# Patient Record
Sex: FEMALE | NOT HISPANIC OR LATINO
[De-identification: names, ages, dates, MRNs, and addresses within clinical notes are randomized per-mention and may not be internally consistent; named-entity substitution may affect disease eponyms.]

---

## 2017-01-03 ENCOUNTER — CHARTING TRANS (OUTPATIENT)
Dept: OTHER | Age: 47
End: 2017-01-03

## 2017-01-04 ENCOUNTER — CHARTING TRANS (OUTPATIENT)
Dept: OTHER | Age: 47
End: 2017-01-04

## 2017-01-10 ENCOUNTER — CHARTING TRANS (OUTPATIENT)
Dept: OTHER | Age: 47
End: 2017-01-10

## 2017-01-11 ENCOUNTER — CHARTING TRANS (OUTPATIENT)
Dept: OTHER | Age: 47
End: 2017-01-11

## 2017-01-13 ENCOUNTER — CHARTING TRANS (OUTPATIENT)
Dept: OTHER | Age: 47
End: 2017-01-13

## 2017-01-18 ENCOUNTER — CHARTING TRANS (OUTPATIENT)
Dept: OTHER | Age: 47
End: 2017-01-18

## 2017-01-20 ENCOUNTER — CHARTING TRANS (OUTPATIENT)
Dept: OTHER | Age: 47
End: 2017-01-20

## 2017-01-23 ENCOUNTER — CHARTING TRANS (OUTPATIENT)
Dept: OTHER | Age: 47
End: 2017-01-23

## 2017-01-25 ENCOUNTER — CHARTING TRANS (OUTPATIENT)
Dept: OTHER | Age: 47
End: 2017-01-25

## 2017-02-06 ENCOUNTER — CHARTING TRANS (OUTPATIENT)
Dept: OTHER | Age: 47
End: 2017-02-06

## 2017-02-10 ENCOUNTER — CHARTING TRANS (OUTPATIENT)
Dept: OTHER | Age: 47
End: 2017-02-10

## 2017-02-13 ENCOUNTER — CHARTING TRANS (OUTPATIENT)
Dept: OTHER | Age: 47
End: 2017-02-13

## 2017-02-16 ENCOUNTER — CHARTING TRANS (OUTPATIENT)
Dept: OTHER | Age: 47
End: 2017-02-16

## 2017-02-20 ENCOUNTER — CHARTING TRANS (OUTPATIENT)
Dept: OTHER | Age: 47
End: 2017-02-20

## 2017-02-23 ENCOUNTER — CHARTING TRANS (OUTPATIENT)
Dept: OTHER | Age: 47
End: 2017-02-23

## 2017-03-01 ENCOUNTER — CHARTING TRANS (OUTPATIENT)
Dept: OTHER | Age: 47
End: 2017-03-01

## 2017-03-06 ENCOUNTER — CHARTING TRANS (OUTPATIENT)
Dept: OTHER | Age: 47
End: 2017-03-06

## 2017-03-08 ENCOUNTER — CHARTING TRANS (OUTPATIENT)
Dept: OTHER | Age: 47
End: 2017-03-08

## 2017-03-14 ENCOUNTER — APPOINTMENT (OUTPATIENT)
Dept: LAB | Age: 47
End: 2017-03-14
Attending: INTERNAL MEDICINE
Payer: COMMERCIAL

## 2017-03-14 DIAGNOSIS — R79.89 ABNORMAL THYROID BLOOD TEST: ICD-10-CM

## 2017-03-14 LAB
FREE T4: 1.2 NG/DL (ref 0.9–1.8)
TSI SER-ACNC: 0.56 MIU/ML (ref 0.35–5.5)

## 2017-03-14 PROCEDURE — 84439 ASSAY OF FREE THYROXINE: CPT

## 2017-03-14 PROCEDURE — 84443 ASSAY THYROID STIM HORMONE: CPT

## 2017-03-14 PROCEDURE — 36415 COLL VENOUS BLD VENIPUNCTURE: CPT

## 2017-04-11 ENCOUNTER — APPOINTMENT (OUTPATIENT)
Dept: LAB | Age: 47
End: 2017-04-11
Attending: OTOLARYNGOLOGY
Payer: COMMERCIAL

## 2017-04-11 DIAGNOSIS — J34.89 NASAL OBSTRUCTION: ICD-10-CM

## 2017-04-11 DIAGNOSIS — J32.0 CHRONIC MAXILLARY SINUSITIS: ICD-10-CM

## 2017-04-11 PROCEDURE — 87205 SMEAR GRAM STAIN: CPT

## 2017-04-11 PROCEDURE — 87070 CULTURE OTHR SPECIMN AEROBIC: CPT

## 2017-04-11 PROCEDURE — 87186 SC STD MICRODIL/AGAR DIL: CPT

## 2017-04-11 PROCEDURE — 87077 CULTURE AEROBIC IDENTIFY: CPT

## 2017-04-19 NOTE — PROGRESS NOTES
Quick Note:    Please inform culture positive for bacterial growth continue with cefdinir and keep follow up  ______

## 2017-04-20 NOTE — PROGRESS NOTES
Quick Note:    Pt informed of results, pt has not started cefdinir yet, sent rx for cefdinir to pt elian. Pt has f/u next week with Dr. Fercho Rodriguez to discuss possible surgery.   ______

## 2017-05-03 ENCOUNTER — LAB ENCOUNTER (OUTPATIENT)
Dept: LAB | Facility: HOSPITAL | Age: 47
End: 2017-05-03
Attending: INTERNAL MEDICINE
Payer: COMMERCIAL

## 2017-05-03 DIAGNOSIS — M35.9 POLYNEUROPATHY IN COLLAGEN VASCULAR DISEASE(357.1): Primary | ICD-10-CM

## 2017-05-03 DIAGNOSIS — Z13.9 SCREENING FOR CONDITION: ICD-10-CM

## 2017-05-03 DIAGNOSIS — E03.9 MYXEDEMA HEART DISEASE: ICD-10-CM

## 2017-05-03 DIAGNOSIS — E61.9 DEFICIENCY OF NUTRIENT ELEMENT: ICD-10-CM

## 2017-05-03 DIAGNOSIS — E55.9 AVITAMINOSIS D: ICD-10-CM

## 2017-05-03 DIAGNOSIS — G63 POLYNEUROPATHY IN COLLAGEN VASCULAR DISEASE(357.1): Primary | ICD-10-CM

## 2017-05-03 DIAGNOSIS — G32.0 SUBACUTE COMBINED DEGENERATION OF SPINAL CORD IN DISEASES CLASSIFIED ELSEWHERE (HCC): ICD-10-CM

## 2017-05-03 DIAGNOSIS — E83.42 HYPOMAGNESEMIA: ICD-10-CM

## 2017-05-03 DIAGNOSIS — B34.9 VIRAL SYNDROME: ICD-10-CM

## 2017-05-03 DIAGNOSIS — E61.1 IRON DEFICIENCY: ICD-10-CM

## 2017-05-03 DIAGNOSIS — I51.9 MYXEDEMA HEART DISEASE: ICD-10-CM

## 2017-05-03 PROCEDURE — 83036 HEMOGLOBIN GLYCOSYLATED A1C: CPT

## 2017-05-03 PROCEDURE — 84443 ASSAY THYROID STIM HORMONE: CPT

## 2017-05-03 PROCEDURE — 86664 EPSTEIN-BARR NUCLEAR ANTIGEN: CPT

## 2017-05-03 PROCEDURE — 82525 ASSAY OF COPPER: CPT

## 2017-05-03 PROCEDURE — 86225 DNA ANTIBODY NATIVE: CPT

## 2017-05-03 PROCEDURE — 84630 ASSAY OF ZINC: CPT

## 2017-05-03 PROCEDURE — 83516 IMMUNOASSAY NONANTIBODY: CPT

## 2017-05-03 PROCEDURE — 82607 VITAMIN B-12: CPT

## 2017-05-03 PROCEDURE — 86255 FLUORESCENT ANTIBODY SCREEN: CPT

## 2017-05-03 PROCEDURE — 82784 ASSAY IGA/IGD/IGG/IGM EACH: CPT

## 2017-05-03 PROCEDURE — 80053 COMPREHEN METABOLIC PANEL: CPT

## 2017-05-03 PROCEDURE — 86141 C-REACTIVE PROTEIN HS: CPT

## 2017-05-03 PROCEDURE — 85025 COMPLETE CBC W/AUTO DIFF WBC: CPT

## 2017-05-03 PROCEDURE — 84482 T3 REVERSE: CPT

## 2017-05-03 PROCEDURE — 82728 ASSAY OF FERRITIN: CPT

## 2017-05-03 PROCEDURE — 86340 INTRINSIC FACTOR ANTIBODY: CPT

## 2017-05-03 PROCEDURE — 83735 ASSAY OF MAGNESIUM: CPT

## 2017-05-03 PROCEDURE — 83550 IRON BINDING TEST: CPT

## 2017-05-03 PROCEDURE — 84481 FREE ASSAY (FT-3): CPT

## 2017-05-03 PROCEDURE — 86235 NUCLEAR ANTIGEN ANTIBODY: CPT

## 2017-05-03 PROCEDURE — 85652 RBC SED RATE AUTOMATED: CPT

## 2017-05-03 PROCEDURE — 83525 ASSAY OF INSULIN: CPT

## 2017-05-03 PROCEDURE — 84425 ASSAY OF VITAMIN B-1: CPT

## 2017-05-03 PROCEDURE — 84439 ASSAY OF FREE THYROXINE: CPT

## 2017-05-03 PROCEDURE — 82746 ASSAY OF FOLIC ACID SERUM: CPT

## 2017-05-03 PROCEDURE — 84255 ASSAY OF SELENIUM: CPT

## 2017-05-03 PROCEDURE — 86800 THYROGLOBULIN ANTIBODY: CPT

## 2017-05-03 PROCEDURE — 82306 VITAMIN D 25 HYDROXY: CPT

## 2017-05-03 PROCEDURE — 84207 ASSAY OF VITAMIN B-6: CPT

## 2017-05-03 PROCEDURE — 83090 ASSAY OF HOMOCYSTEINE: CPT

## 2017-05-03 PROCEDURE — 84466 ASSAY OF TRANSFERRIN: CPT

## 2017-05-03 PROCEDURE — 86376 MICROSOMAL ANTIBODY EACH: CPT

## 2017-05-03 PROCEDURE — 86665 EPSTEIN-BARR CAPSID VCA: CPT

## 2017-05-03 PROCEDURE — 36415 COLL VENOUS BLD VENIPUNCTURE: CPT

## 2017-05-03 PROCEDURE — 83540 ASSAY OF IRON: CPT

## 2017-05-03 RX ORDER — MAGNESIUM OXIDE 400 MG (241.3 MG MAGNESIUM) TABLET
400 TABLET DAILY
COMMUNITY

## 2017-06-06 ENCOUNTER — ANESTHESIA EVENT (OUTPATIENT)
Dept: SURGERY | Facility: HOSPITAL | Age: 47
End: 2017-06-06
Payer: COMMERCIAL

## 2017-06-07 ENCOUNTER — ANESTHESIA (OUTPATIENT)
Dept: SURGERY | Facility: HOSPITAL | Age: 47
End: 2017-06-07
Payer: COMMERCIAL

## 2017-06-07 ENCOUNTER — HOSPITAL ENCOUNTER (OUTPATIENT)
Facility: HOSPITAL | Age: 47
Setting detail: HOSPITAL OUTPATIENT SURGERY
Discharge: HOME | End: 2017-06-07
Attending: OTOLARYNGOLOGY | Admitting: OTOLARYNGOLOGY
Payer: COMMERCIAL

## 2017-06-07 ENCOUNTER — SURGERY (OUTPATIENT)
Age: 47
End: 2017-06-07

## 2017-06-07 VITALS
HEIGHT: 68 IN | TEMPERATURE: 99 F | SYSTOLIC BLOOD PRESSURE: 109 MMHG | BODY MASS INDEX: 24.55 KG/M2 | RESPIRATION RATE: 14 BRPM | DIASTOLIC BLOOD PRESSURE: 66 MMHG | WEIGHT: 162 LBS | OXYGEN SATURATION: 95 % | HEART RATE: 48 BPM

## 2017-06-07 DIAGNOSIS — J32.0 CHRONIC MAXILLARY SINUSITIS: ICD-10-CM

## 2017-06-07 DIAGNOSIS — J34.89 ATROPHY OF NASAL TURBINATES: ICD-10-CM

## 2017-06-07 DIAGNOSIS — K08.89 LOOSENING OF TOOTH: ICD-10-CM

## 2017-06-07 PROCEDURE — 87206 SMEAR FLUORESCENT/ACID STAI: CPT | Performed by: OTOLARYNGOLOGY

## 2017-06-07 PROCEDURE — 87102 FUNGUS ISOLATION CULTURE: CPT | Performed by: OTOLARYNGOLOGY

## 2017-06-07 PROCEDURE — 87070 CULTURE OTHR SPECIMN AEROBIC: CPT | Performed by: OTOLARYNGOLOGY

## 2017-06-07 PROCEDURE — 88311 DECALCIFY TISSUE: CPT | Performed by: OTOLARYNGOLOGY

## 2017-06-07 PROCEDURE — 09BQ4ZZ EXCISION OF RIGHT MAXILLARY SINUS, PERCUTANEOUS ENDOSCOPIC APPROACH: ICD-10-PCS | Performed by: OTOLARYNGOLOGY

## 2017-06-07 PROCEDURE — 8E09XBZ COMPUTER ASSISTED PROCEDURE OF HEAD AND NECK REGION: ICD-10-PCS | Performed by: OTOLARYNGOLOGY

## 2017-06-07 PROCEDURE — 87075 CULTR BACTERIA EXCEPT BLOOD: CPT | Performed by: OTOLARYNGOLOGY

## 2017-06-07 PROCEDURE — 87186 SC STD MICRODIL/AGAR DIL: CPT | Performed by: OTOLARYNGOLOGY

## 2017-06-07 PROCEDURE — 88304 TISSUE EXAM BY PATHOLOGIST: CPT | Performed by: OTOLARYNGOLOGY

## 2017-06-07 PROCEDURE — 81025 URINE PREGNANCY TEST: CPT | Performed by: OTOLARYNGOLOGY

## 2017-06-07 PROCEDURE — 87205 SMEAR GRAM STAIN: CPT | Performed by: OTOLARYNGOLOGY

## 2017-06-07 PROCEDURE — 87077 CULTURE AEROBIC IDENTIFY: CPT | Performed by: OTOLARYNGOLOGY

## 2017-06-07 RX ORDER — ONDANSETRON 2 MG/ML
4 INJECTION INTRAMUSCULAR; INTRAVENOUS AS NEEDED
Status: DISCONTINUED | OUTPATIENT
Start: 2017-06-07 | End: 2017-06-07

## 2017-06-07 RX ORDER — HYDROMORPHONE HYDROCHLORIDE 1 MG/ML
INJECTION, SOLUTION INTRAMUSCULAR; INTRAVENOUS; SUBCUTANEOUS
Status: COMPLETED
Start: 2017-06-07 | End: 2017-06-07

## 2017-06-07 RX ORDER — HYDROCODONE BITARTRATE AND ACETAMINOPHEN 5; 325 MG/1; MG/1
2 TABLET ORAL AS NEEDED
Status: COMPLETED | OUTPATIENT
Start: 2017-06-07 | End: 2017-06-07

## 2017-06-07 RX ORDER — HYDROCODONE BITARTRATE AND ACETAMINOPHEN 7.5; 325 MG/1; MG/1
1 TABLET ORAL EVERY 4 HOURS PRN
Qty: 30 TABLET | Refills: 1 | Status: SHIPPED | OUTPATIENT
Start: 2017-06-07 | End: 2017-06-17

## 2017-06-07 RX ORDER — HYDROCODONE BITARTRATE AND ACETAMINOPHEN 5; 325 MG/1; MG/1
1 TABLET ORAL AS NEEDED
Status: COMPLETED | OUTPATIENT
Start: 2017-06-07 | End: 2017-06-07

## 2017-06-07 RX ORDER — SODIUM CHLORIDE, SODIUM LACTATE, POTASSIUM CHLORIDE, CALCIUM CHLORIDE 600; 310; 30; 20 MG/100ML; MG/100ML; MG/100ML; MG/100ML
INJECTION, SOLUTION INTRAVENOUS CONTINUOUS
Status: DISCONTINUED | OUTPATIENT
Start: 2017-06-07 | End: 2017-06-07

## 2017-06-07 RX ORDER — HYDROMORPHONE HYDROCHLORIDE 1 MG/ML
0.4 INJECTION, SOLUTION INTRAMUSCULAR; INTRAVENOUS; SUBCUTANEOUS EVERY 5 MIN PRN
Status: DISCONTINUED | OUTPATIENT
Start: 2017-06-07 | End: 2017-06-07

## 2017-06-07 RX ORDER — METOCLOPRAMIDE HYDROCHLORIDE 5 MG/ML
10 INJECTION INTRAMUSCULAR; INTRAVENOUS AS NEEDED
Status: DISCONTINUED | OUTPATIENT
Start: 2017-06-07 | End: 2017-06-07

## 2017-06-07 RX ORDER — LIDOCAINE HYDROCHLORIDE AND EPINEPHRINE 10; 10 MG/ML; UG/ML
INJECTION, SOLUTION INFILTRATION; PERINEURAL AS NEEDED
Status: DISCONTINUED | OUTPATIENT
Start: 2017-06-07 | End: 2017-06-07 | Stop reason: HOSPADM

## 2017-06-07 RX ORDER — AZITHROMYCIN 250 MG/1
250 TABLET, FILM COATED ORAL DAILY
Qty: 1 PACKAGE | Refills: 0 | Status: SHIPPED | OUTPATIENT
Start: 2017-06-07 | End: 2017-09-19 | Stop reason: ALTCHOICE

## 2017-06-07 RX ORDER — NALOXONE HYDROCHLORIDE 0.4 MG/ML
80 INJECTION, SOLUTION INTRAMUSCULAR; INTRAVENOUS; SUBCUTANEOUS AS NEEDED
Status: DISCONTINUED | OUTPATIENT
Start: 2017-06-07 | End: 2017-06-07

## 2017-06-07 NOTE — ANESTHESIA PREPROCEDURE EVALUATION
PRE-OP EVALUATION    Patient Name: Paul Novak    Pre-op Diagnosis: Chronic maxillary sinusitis [J32.0]  Atrophy of nasal turbinates [J34.89]  Loosening of tooth [K08.89]    Procedure(s):   stealth right maxillary antrostomy and possible right inferior an Endo/Other  Comment: raynauds disease         (+) hypothyroidism                       Pulmonary  Comment: Chronic maxillary sinusitis (J32.0); Atrophy of nasal turbinates (J34.89); Loosening of tooth (K08.89)  Negative pulmonary ROS. benefits of general anesthesia including but not limited to sore throat and/or jaw, nausea/vomiting, headache, muscle soreness, damage to eyes/ears/nose, and dental damage. Patient agrees with plan. All questions and concerns addressed and answered.     Pl

## 2017-06-07 NOTE — ANESTHESIA POSTPROCEDURE EVALUATION
[address] [facility] [address] Ruler Patient Status:  Hospital Outpatient Surgery   Age/Gender 55year old female MRN [id number]   [facility] SURGERY Attending Jose Martin Chung MD   Hosp Day # 0 PCP Caitlyn Farrell MD       Anesthesia

## 2017-06-07 NOTE — OPERATIVE REPORT
[address]    PATIENT'S NAME: THOMASRAFFAELE HARDY AJ   ATTENDING PHYSICIAN: Verenice Lindquist M.D. OPERATING PHYSICIAN: Verenice Lindquist M.D.    PATIENT ACCOUNT#:   [de-identified]    LOCATION:  [address] [place] [address] 10  MEDICAL RECORD #:   [id number] shravan were utilized. The right maxillary antrostomy was widened.   Upon widening this and identifying the middle maxillary sinus polypoid material, this was debrided back and identifying a inferior-posterior cream-filled yellow cyst.  This was unable to

## 2017-06-07 NOTE — BRIEF OP NOTE
Pre-Operative Diagnosis: Chronic maxillary sinusitis [J32.0]  Atrophy of nasal turbinates [J34.89]  Loosening of tooth [K08.89]     Post-Operative Diagnosis: same     Procedure Performed:   Procedure(s):   stealth right maxillary antrostomy and right in

## 2017-06-21 NOTE — H&P
Cc: No chief complaint on file. Referring Provider: No ref. provider found  Primary Care Provider: Selene Almanzar.  Andie Upton MD     HPI:    Elvin Chisholm is a 55year old female with history of right facial pain and drainage       ROS:     ENT: No he • History of bowel resection      2001   • Visual impairment      contacts and glasses   • Disorder of thyroid    • Neuropathy ([address]      Pt denies   • Back problem    • Hepatitis      as a baby   • Diverticulosis of large intestine           Past Surgi symmetric movement  Oral mucosa normal  Hard palate normal   Oropharynx: Soft palate is normal   Right tonsil is normal   Left tonsil is normal  Tonsil appearance normal   Uvula is normal  Posterior pharyngeal wall is normal   Neck: Neck inspection normal

## 2017-07-21 ENCOUNTER — LAB ENCOUNTER (OUTPATIENT)
Dept: LAB | Age: 47
End: 2017-07-21
Attending: NURSE PRACTITIONER
Payer: COMMERCIAL

## 2017-07-21 DIAGNOSIS — E61.1 IRON DEFICIENCY: ICD-10-CM

## 2017-07-21 DIAGNOSIS — E03.9 PRIMARY HYPOTHYROIDISM: Primary | ICD-10-CM

## 2017-07-21 DIAGNOSIS — E61.9 DEFICIENCY OF NUTRIENT ELEMENT: ICD-10-CM

## 2017-07-21 DIAGNOSIS — R79.0 CALCIUM BLOOD DECREASED: ICD-10-CM

## 2017-07-21 DIAGNOSIS — E83.10 DISORDER OF IRON METABOLISM: ICD-10-CM

## 2017-07-21 LAB
DEPRECATED HBV CORE AB SER IA-ACNC: 49 NG/ML (ref 10–291)
FREE T4: 1.2 NG/DL (ref 0.9–1.8)
IRON SATURATION: 13 % (ref 13–45)
IRON: 45 UG/DL (ref 28–170)
T3FREE SERPL-MCNC: 2.24 PG/ML (ref 2.3–4.2)
TOTAL IRON BINDING CAPACITY: 334 UG/DL (ref 298–536)
TRANSFERRIN: 224 MG/DL (ref 200–360)
TSI SER-ACNC: 0.23 MIU/ML (ref 0.35–5.5)

## 2017-07-21 PROCEDURE — 36415 COLL VENOUS BLD VENIPUNCTURE: CPT

## 2017-07-21 PROCEDURE — 84439 ASSAY OF FREE THYROXINE: CPT

## 2017-07-21 PROCEDURE — 83540 ASSAY OF IRON: CPT

## 2017-07-21 PROCEDURE — 84207 ASSAY OF VITAMIN B-6: CPT

## 2017-07-21 PROCEDURE — 83550 IRON BINDING TEST: CPT

## 2017-07-21 PROCEDURE — 84482 T3 REVERSE: CPT

## 2017-07-21 PROCEDURE — 84445 ASSAY OF TSI GLOBULIN: CPT

## 2017-07-21 PROCEDURE — 82728 ASSAY OF FERRITIN: CPT

## 2017-07-21 PROCEDURE — 82652 VIT D 1 25-DIHYDROXY: CPT

## 2017-07-21 PROCEDURE — 84481 FREE ASSAY (FT-3): CPT

## 2017-07-21 PROCEDURE — 84443 ASSAY THYROID STIM HORMONE: CPT

## 2017-07-23 LAB — 1,25-DIHYDROXYVITAMIN D: 83.4 PG/ML

## 2017-07-24 LAB
TRIIODOTHYRONINE, REVERSE: 20.6 NG/DL
VITAMIN B6: 300.6 NMOL/L

## 2017-07-25 LAB — THYROID STIMULATING IMMUNOGLOBULIN: 97 %

## 2017-10-10 PROCEDURE — 86780 TREPONEMA PALLIDUM: CPT | Performed by: OBSTETRICS & GYNECOLOGY

## 2017-10-10 PROCEDURE — 87491 CHLMYD TRACH DNA AMP PROBE: CPT | Performed by: OBSTETRICS & GYNECOLOGY

## 2017-10-10 PROCEDURE — 87340 HEPATITIS B SURFACE AG IA: CPT | Performed by: OBSTETRICS & GYNECOLOGY

## 2017-10-10 PROCEDURE — 36415 COLL VENOUS BLD VENIPUNCTURE: CPT | Performed by: OBSTETRICS & GYNECOLOGY

## 2017-10-10 PROCEDURE — 87389 HIV-1 AG W/HIV-1&-2 AB AG IA: CPT | Performed by: OBSTETRICS & GYNECOLOGY

## 2017-10-10 PROCEDURE — 86803 HEPATITIS C AB TEST: CPT | Performed by: OBSTETRICS & GYNECOLOGY

## 2017-10-10 PROCEDURE — 87591 N.GONORRHOEAE DNA AMP PROB: CPT | Performed by: OBSTETRICS & GYNECOLOGY

## 2017-10-31 ENCOUNTER — LAB ENCOUNTER (OUTPATIENT)
Dept: LAB | Age: 47
End: 2017-10-31
Attending: INTERNAL MEDICINE
Payer: COMMERCIAL

## 2017-10-31 DIAGNOSIS — E61.1 IRON DEFICIENCY: ICD-10-CM

## 2017-10-31 DIAGNOSIS — E06.3 CHRONIC LYMPHOCYTIC THYROIDITIS: Primary | ICD-10-CM

## 2017-10-31 PROCEDURE — 83540 ASSAY OF IRON: CPT

## 2017-10-31 PROCEDURE — 82728 ASSAY OF FERRITIN: CPT

## 2017-10-31 PROCEDURE — 83550 IRON BINDING TEST: CPT

## 2017-10-31 PROCEDURE — 86376 MICROSOMAL ANTIBODY EACH: CPT

## 2017-10-31 PROCEDURE — 84439 ASSAY OF FREE THYROXINE: CPT

## 2017-10-31 PROCEDURE — 86800 THYROGLOBULIN ANTIBODY: CPT

## 2017-10-31 PROCEDURE — 36415 COLL VENOUS BLD VENIPUNCTURE: CPT

## 2017-10-31 PROCEDURE — 84481 FREE ASSAY (FT-3): CPT

## 2017-10-31 PROCEDURE — 84482 T3 REVERSE: CPT

## 2017-10-31 PROCEDURE — 84443 ASSAY THYROID STIM HORMONE: CPT

## 2018-03-30 PROCEDURE — 87338 HPYLORI STOOL AG IA: CPT | Performed by: INTERNAL MEDICINE

## 2018-04-02 PROCEDURE — 88305 TISSUE EXAM BY PATHOLOGIST: CPT | Performed by: INTERNAL MEDICINE

## 2018-04-04 ENCOUNTER — HOSPITAL ENCOUNTER (OUTPATIENT)
Dept: ULTRASOUND IMAGING | Age: 48
Discharge: HOME | End: 2018-04-04
Attending: INTERNAL MEDICINE
Payer: COMMERCIAL

## 2018-04-04 DIAGNOSIS — R10.13 EPIGASTRIC PAIN: ICD-10-CM

## 2018-04-04 DIAGNOSIS — R10.13 ABDOMINAL PAIN, EPIGASTRIC: ICD-10-CM

## 2018-04-04 PROCEDURE — 76700 US EXAM ABDOM COMPLETE: CPT | Performed by: INTERNAL MEDICINE

## 2018-04-04 NOTE — PROGRESS NOTES
Ultrasound looked okay. --I will order the HIDA scan of the gallbladder and a CT scan of the abdomen.   --please call my office if you need help scheduling these with the radiology department     MD [facility]

## 2019-11-18 PROBLEM — R87.810 CERVICAL HIGH RISK HPV (HUMAN PAPILLOMAVIRUS) TEST POSITIVE: Status: ACTIVE | Noted: 2019-11-18

## 2020-01-21 PROCEDURE — 88305 TISSUE EXAM BY PATHOLOGIST: CPT | Performed by: RADIOLOGY

## (undated) DEVICE — BLADE 1883519HR RAD90 3PK M4 3.5MM ROTAT: Brand: RAD

## (undated) DEVICE — SINUS CDS: Brand: MEDLINE INDUSTRIES, INC.

## (undated) DEVICE — BLADE 1884006EM RAD40 4MM M4 ROTATE ROHS: Brand: FUSION®

## (undated) DEVICE — BLADE 1884080EM TRICUT 4MMX13CM M4 ROHS: Brand: FUSION®

## (undated) DEVICE — TUBING 1895522 5PK STRAIGHTSHOT TO XPS: Brand: STRAIGHTSHOT®

## (undated) DEVICE — PAD SACRAL SPAN AID

## (undated) DEVICE — GLOVE ALOETOUCH ORTHO SZ 8

## (undated) DEVICE — SOL  .9 1000ML BTL

## (undated) DEVICE — KENDALL SCD EXPRESS SLEEVES, KNEE LENGTH, MEDIUM: Brand: KENDALL SCD

## (undated) DEVICE — SYRINGE 3ML LL TIP

## (undated) DEVICE — POSITIONER OR 9X8X4.5IN NLTX

## (undated) DEVICE — PATIENT TRACKER 9734887XOM NON-INVASIVE

## (undated) DEVICE — INSTRUMENT TRACKER 9733533XOM ENT 1PK

## (undated) DEVICE — Device: Brand: NAKAO QUICKTRAP

## (undated) NOTE — LETTER
4/4/2018      Silvia ROCHA Ruler  [address] [place] [address]  [place] [id number]              Dear Debbie Maldonado,       Here are your results from your recent visit with Gastroenterology. Ultrasound looked okay.   --I will order the HIDA scan of the gallbladd

## (undated) NOTE — IP AVS SNAPSHOT
[facility] [place] [address] [facility], [address] ~ [phone]                Discharge Summary   6/7/2017    [address]           Admission Information        Provider Department    6/7/2017 MD Gabriel Cifuentes / Leroy Solis [    ]    [    ]       Fluticasone Propionate 50 MCG/ACT Susp   Commonly known as:  FLONASE        1 spray by Nasal route 2 (two) times daily.       [    ]    [    ]    [    ]    [    ]       GLUCOSAMINE CHONDROITIN ADV OR        Take by mouth 2 (two) time • Take you usually prescribed medication but do not take any aspirin or ibuprofen  type medications. • Prescribed pain medication and Tylenol are acceptable. But do not take both  medications at the same time.   • Please take your antibiotics until there i Specialty:  Priyanka Mott information:    [address] [place] [address]         Future Appointments        Provider Department    6/20/2017 4:50 PM Bebo Coulter, ENT - [address] [facility] Form - If you don’t have insurance, Batsheva Cisneros has partnered with [facility] to help you get signed up for insurance coverage.   [facility] is a Federal Navigator program that can help with your Affordable Care Act cover